# Patient Record
Sex: FEMALE
[De-identification: names, ages, dates, MRNs, and addresses within clinical notes are randomized per-mention and may not be internally consistent; named-entity substitution may affect disease eponyms.]

---

## 2022-10-19 ENCOUNTER — HOSPITAL ENCOUNTER (INPATIENT)
Dept: HOSPITAL 75 - ER | Age: 62
LOS: 3 days | Discharge: HOME | DRG: 872 | End: 2022-10-22
Attending: INTERNAL MEDICINE | Admitting: INTERNAL MEDICINE
Payer: MEDICAID

## 2022-10-19 VITALS — DIASTOLIC BLOOD PRESSURE: 62 MMHG | SYSTOLIC BLOOD PRESSURE: 102 MMHG

## 2022-10-19 VITALS — DIASTOLIC BLOOD PRESSURE: 65 MMHG | SYSTOLIC BLOOD PRESSURE: 106 MMHG

## 2022-10-19 VITALS — WEIGHT: 293 LBS | BODY MASS INDEX: 50.64 KG/M2 | HEIGHT: 63.78 IN

## 2022-10-19 DIAGNOSIS — Z91.199: ICD-10-CM

## 2022-10-19 DIAGNOSIS — E11.65: ICD-10-CM

## 2022-10-19 DIAGNOSIS — E87.8: ICD-10-CM

## 2022-10-19 DIAGNOSIS — I10: ICD-10-CM

## 2022-10-19 DIAGNOSIS — H35.30: ICD-10-CM

## 2022-10-19 DIAGNOSIS — E78.00: ICD-10-CM

## 2022-10-19 DIAGNOSIS — Z87.891: ICD-10-CM

## 2022-10-19 DIAGNOSIS — F41.9: ICD-10-CM

## 2022-10-19 DIAGNOSIS — G25.81: ICD-10-CM

## 2022-10-19 DIAGNOSIS — N39.0: ICD-10-CM

## 2022-10-19 DIAGNOSIS — E87.1: ICD-10-CM

## 2022-10-19 DIAGNOSIS — E83.42: ICD-10-CM

## 2022-10-19 DIAGNOSIS — E87.6: ICD-10-CM

## 2022-10-19 DIAGNOSIS — E11.40: ICD-10-CM

## 2022-10-19 DIAGNOSIS — N17.9: ICD-10-CM

## 2022-10-19 DIAGNOSIS — A41.51: Primary | ICD-10-CM

## 2022-10-19 DIAGNOSIS — Z20.822: ICD-10-CM

## 2022-10-19 DIAGNOSIS — R51.9: ICD-10-CM

## 2022-10-19 DIAGNOSIS — E86.0: ICD-10-CM

## 2022-10-19 DIAGNOSIS — F32.A: ICD-10-CM

## 2022-10-19 DIAGNOSIS — R11.0: ICD-10-CM

## 2022-10-19 LAB
ALBUMIN SERPL-MCNC: 3.8 GM/DL (ref 3.2–4.5)
ALP SERPL-CCNC: 72 U/L (ref 40–136)
ALT SERPL-CCNC: 34 U/L (ref 0–55)
AMYLASE SERPL-CCNC: 48 U/L (ref 25–125)
APTT BLD: 37 SEC (ref 24–35)
APTT PPP: YELLOW S
BACTERIA #/AREA URNS HPF: (no result) /HPF
BARBITURATES UR QL: NEGATIVE
BASOPHILS # BLD AUTO: 0 10^3/UL (ref 0–0.1)
BASOPHILS NFR BLD AUTO: 0 % (ref 0–10)
BENZODIAZ UR QL SCN: POSITIVE
BILIRUB SERPL-MCNC: 0.8 MG/DL (ref 0.1–1)
BILIRUB UR QL STRIP: NEGATIVE
BUN/CREAT SERPL: 12
CALCIUM SERPL-MCNC: 9.3 MG/DL (ref 8.5–10.1)
CHLORIDE SERPL-SCNC: 81 MMOL/L (ref 98–107)
CO2 SERPL-SCNC: 30 MMOL/L (ref 21–32)
COCAINE UR QL: NEGATIVE
CREAT SERPL-MCNC: 1.12 MG/DL (ref 0.6–1.3)
EOSINOPHIL # BLD AUTO: 0 10^3/UL (ref 0–0.3)
EOSINOPHIL NFR BLD AUTO: 0 % (ref 0–10)
ERYTHROCYTE [SEDIMENTATION RATE] IN BLOOD: 73 MM/HR (ref 0–30)
FIBRINOGEN PPP-MCNC: CLEAR MG/DL
GFR SERPLBLD BASED ON 1.73 SQ M-ARVRAT: 56 ML/MIN
GLUCOSE SERPL-MCNC: 292 MG/DL (ref 70–105)
GLUCOSE UR STRIP-MCNC: (no result) MG/DL
HCT VFR BLD CALC: 39 % (ref 35–52)
HGB BLD-MCNC: 13.9 G/DL (ref 11.5–16)
INR PPP: 1.1 (ref 0.8–1.4)
KETONES UR QL STRIP: NEGATIVE
LEUKOCYTE ESTERASE UR QL STRIP: (no result)
LIPASE SERPL-CCNC: 221 U/L (ref 8–78)
LYMPHOCYTES # BLD AUTO: 0.8 10^3/UL (ref 1–4)
LYMPHOCYTES NFR BLD AUTO: 6 % (ref 12–44)
MAGNESIUM SERPL-MCNC: 1.5 MG/DL (ref 1.6–2.4)
MANUAL DIFFERENTIAL PERFORMED BLD QL: YES
MCH RBC QN AUTO: 30 PG (ref 25–34)
MCHC RBC AUTO-ENTMCNC: 35 G/DL (ref 32–36)
MCV RBC AUTO: 85 FL (ref 80–99)
METHADONE UR QL SCN: NEGATIVE
MONOCYTES # BLD AUTO: 0.9 10^3/UL (ref 0–1)
MONOCYTES NFR BLD AUTO: 7 % (ref 0–12)
MONOCYTES NFR BLD: 5 %
NEUTROPHILS # BLD AUTO: 10.8 10^3/UL (ref 1.8–7.8)
NEUTROPHILS NFR BLD AUTO: 86 % (ref 42–75)
NEUTS BAND NFR BLD MANUAL: 85 %
NITRITE UR QL STRIP: NEGATIVE
OPIATES UR QL SCN: NEGATIVE
OXYCODONE UR QL: NEGATIVE
PH UR STRIP: 6 [PH] (ref 5–9)
PLATELET # BLD: 161 10^3/UL (ref 130–400)
PMV BLD AUTO: 10 FL (ref 9–12.2)
POTASSIUM SERPL-SCNC: 2.9 MMOL/L (ref 3.6–5)
PROPOXYPH UR QL: NEGATIVE
PROT SERPL-MCNC: 7.5 GM/DL (ref 6.4–8.2)
PROT UR QL STRIP: (no result)
PROTHROMBIN TIME: 15 SEC (ref 12.2–14.7)
RBC #/AREA URNS HPF: (no result) /HPF
RBC MORPH BLD: NORMAL
SODIUM SERPL-SCNC: 124 MMOL/L (ref 135–145)
SP GR UR STRIP: 1.02 (ref 1.02–1.02)
TRICYCLICS UR QL SCN: NEGATIVE
VARIANT LYMPHS NFR BLD MANUAL: 10 %
WBC # BLD AUTO: 12.5 10^3/UL (ref 4.3–11)
WBC #/AREA URNS HPF: (no result) /HPF

## 2022-10-19 PROCEDURE — 87636 SARSCOV2 & INF A&B AMP PRB: CPT

## 2022-10-19 PROCEDURE — 93041 RHYTHM ECG TRACING: CPT

## 2022-10-19 PROCEDURE — 83036 HEMOGLOBIN GLYCOSYLATED A1C: CPT

## 2022-10-19 PROCEDURE — 82010 KETONE BODYS QUAN: CPT

## 2022-10-19 PROCEDURE — 36415 COLL VENOUS BLD VENIPUNCTURE: CPT

## 2022-10-19 PROCEDURE — 87077 CULTURE AEROBIC IDENTIFY: CPT

## 2022-10-19 PROCEDURE — 93005 ELECTROCARDIOGRAM TRACING: CPT

## 2022-10-19 PROCEDURE — 71045 X-RAY EXAM CHEST 1 VIEW: CPT

## 2022-10-19 PROCEDURE — 86141 C-REACTIVE PROTEIN HS: CPT

## 2022-10-19 PROCEDURE — 80320 DRUG SCREEN QUANTALCOHOLS: CPT

## 2022-10-19 PROCEDURE — 87040 BLOOD CULTURE FOR BACTERIA: CPT

## 2022-10-19 PROCEDURE — 83605 ASSAY OF LACTIC ACID: CPT

## 2022-10-19 PROCEDURE — 94760 N-INVAS EAR/PLS OXIMETRY 1: CPT

## 2022-10-19 PROCEDURE — 80306 DRUG TEST PRSMV INSTRMNT: CPT

## 2022-10-19 PROCEDURE — 85025 COMPLETE CBC W/AUTO DIFF WBC: CPT

## 2022-10-19 PROCEDURE — 85730 THROMBOPLASTIN TIME PARTIAL: CPT

## 2022-10-19 PROCEDURE — 82150 ASSAY OF AMYLASE: CPT

## 2022-10-19 PROCEDURE — 87186 SC STD MICRODIL/AGAR DIL: CPT

## 2022-10-19 PROCEDURE — 84145 PROCALCITONIN (PCT): CPT

## 2022-10-19 PROCEDURE — 74177 CT ABD & PELVIS W/CONTRAST: CPT

## 2022-10-19 PROCEDURE — 81000 URINALYSIS NONAUTO W/SCOPE: CPT

## 2022-10-19 PROCEDURE — 85027 COMPLETE CBC AUTOMATED: CPT

## 2022-10-19 PROCEDURE — 83735 ASSAY OF MAGNESIUM: CPT

## 2022-10-19 PROCEDURE — 82947 ASSAY GLUCOSE BLOOD QUANT: CPT

## 2022-10-19 PROCEDURE — 85007 BL SMEAR W/DIFF WBC COUNT: CPT

## 2022-10-19 PROCEDURE — 85652 RBC SED RATE AUTOMATED: CPT

## 2022-10-19 PROCEDURE — 83690 ASSAY OF LIPASE: CPT

## 2022-10-19 PROCEDURE — 85610 PROTHROMBIN TIME: CPT

## 2022-10-19 PROCEDURE — 80053 COMPREHEN METABOLIC PANEL: CPT

## 2022-10-19 PROCEDURE — 87088 URINE BACTERIA CULTURE: CPT

## 2022-10-19 RX ADMIN — INSULIN ASPART SCH UNIT: 100 INJECTION, SOLUTION INTRAVENOUS; SUBCUTANEOUS at 21:37

## 2022-10-19 RX ADMIN — SENNOSIDES SCH MG: 8.6 TABLET, FILM COATED ORAL at 21:00

## 2022-10-19 RX ADMIN — DOCUSATE SODIUM SCH MG: 100 CAPSULE ORAL at 21:00

## 2022-10-19 RX ADMIN — SODIUM CHLORIDE AND POTASSIUM CHLORIDE SCH MLS/HR: 9; 1.49 INJECTION, SOLUTION INTRAVENOUS at 22:06

## 2022-10-19 NOTE — DIAGNOSTIC IMAGING REPORT
PATIENT HISTORY: Fever. 



TECHNIQUE: Single frontal view of the chest.



COMPARISON: None.



FINDINGS:



The lung volumes are normal. No focal consolidation is seen. No

large pleural effusion or pneumothorax is seen. The

cardiomediastinal silhouette is normal in size and contour. No

acute osseous abnormality is seen. 



IMPRESSION:



No acute pulmonary abnormality seen.



Dictated by: 



  Dictated on workstation # [id number]

## 2022-10-19 NOTE — DIAGNOSTIC IMAGING REPORT
EXAMINATION: CT abdomen and pelvis with intravenous contrast.



TECHNIQUE: Multiple contiguous axial images were obtained through

the abdomen and pelvis after the uneventful administration of

intravenous contrast. All CT scans use one or more of the

following dose optimizing techniques: automated exposure control,

MA and/or KvP adjustment based on patient size and exam type or

iterative reconstruction. 



HISTORY: Abdominal pain.



COMPARISON: None available.



FINDINGS: Limited views of the lower thorax are unremarkable.



The liver is normal without focal lesion. There is no biliary

ductal dilation. Gallbladder is normal. Pancreas is normal.

Spleen is normal. Adrenal glands are normal.



The kidneys are normal. There is no hydronephrosis. Urinary

bladder is normal.



Bowel is normal in caliber without obstruction or inflammation.

No free fluid or air. No abdominal or pelvic lymphadenopathy.

Aorta is normal in caliber without aneurysm.



There is no suspicious osseous lesion.



IMPRESSION: No acute abnormality in the abdomen or pelvis.



Dictated by: 



  Dictated on workstation # [id number]

## 2022-10-19 NOTE — ED GENERAL
General


Chief Complaint:  Glucose Problems


Stated Complaint:  HIGH BLOOD SUGAR,DIABETIC,FATIGUE


Nursing Triage Note:  


ARRIVED VIA AMB TO TRIAGE WITH COMPLAINTS OF HIGH BLOOD SUGAR.  PT RECENTLY TOOK




HERSELF OFF ALL HER DIABETIC MEDS BUT STARTED THEM AGAIN YESTERDAY.  PT IS ALSO 


BEING TREATED FOR A UTI.


Source of Information:  Patient





History of Present Illness


Date Seen by Provider:  Oct 19, 2022


Time Seen by Provider:  18:12


Initial Comments


PT ARRIVES VIA POV FROM HOME


C/O ELEVATED BLOOD SUGAR


STATES HIGHEST HAS BEEN 464, HAS BEEN 'S ALL DAY TODAY


PT SELF DC'D ALL OF HER MEDICATIONS A FEW MONTHS AGO


STARTED CHECKING HER BLOOD SUGAR ON SUNDAY


RESTARTED HER MEDICATIONS YESTERDAY





C/O BEING TIRED


C/O NAUSEA, AND VOMITED X 1 TODAY AT [facility]


NO DIARRHEA, HAD NORMAL BM TODAY


FEVER 100-101 SINCE SUNDAY. TOOK MOTRIN X 1 AT NOON TODAY. 


+ GENERALIZED ABDOMINAL PAIN 


+ UTI SYMPTOMS--PAIN, URGENCY, FREQUENCY, SMALL AMOUNTS, INCONTINENCE. 





NO URI SYMPTOMS


NO COUGH OR SHORTNESS OF BREATH


NO CHEST PAIN 





WENT TO [facility] WALK IN CLINIC LAST NIGHT AND HAD UA DONE AND GIVEN RX FOR 

MACROBID. NO OTHER TESTS WERE DONE





PT HAS NOT HAD COVID VACCINE





NO PRIOR VISITS HERE





PCP: [facility]. DR. CHRISTINE VAUGHAN, HAS NOT BEEN THERE IN A FEW MONTHS.





Allergies and Home Medications


Allergies


Coded Allergies:  


     penicillin V (Verified  Allergy, Severe, HIVES, 10/19/22)


     pregabalin (Verified  Allergy, Severe, EFFECTS EYE SIGHT, 10/19/22)


     codeine (Verified  Adverse Reaction, Unknown, VOMITING, 10/19/22)





Review of Systems


Review of Systems


Constitutional:  see HPI, fever, malaise, weakness


EENTM:  no symptoms reported


Respiratory:  no symptoms reported


Cardiovascular:  no symptoms reported


Gastrointestinal:  see HPI, abdominal pain; No constipation, No diarrhea; 

nausea, vomiting


Genitourinary:  see HPI, dysuria, frequency, incontinence, pain


Musculoskeletal:  no symptoms reported


Skin:  no symptoms reported


Psychiatric/Neurological:  No Symptoms Reported


Hematologic/Lymphatic:  No Symptoms Reported


Immunological/Allergic:  no symptoms reported





Past Medical-Social-Family Hx


Patient Social History


Tobacco Use?:  Yes


Tobacco type used:  Cigarettes


Smoking Status:  Former Smoker


Substance use?:  No


Alcohol Use?:  Yes


Alcohol Frequency:  Couple times a week





Past Medical History


Surgeries:  Yes


Appendectomy, Hysterectomy, Oophorectomy, Orthopedic


Respiratory:  No


Cardiac:  Yes


High Cholesterol, Hypertension


Neurological:  Yes


Neuropathy


Reproductive Disorders:  Yes


Female Reproductive Disorders:  Menstrual Problems


GYN History:  Hysterectomy, Menopausal


Genitourinary:  No


Gastrointestinal:  Yes (S/P APPY)


Musculoskeletal:  Yes (BILATERAL HIP SCOPES; LEFT ACHILLES TENDON REPAIR)


Endocrine:  Yes (OBESITY)


Diabetes, Non-Insulin dep


HEENT:  Yes ("LEGALLY BLIND" )


Macular Degeneration


Cancer:  No


Psychosocial:  Yes


Anxiety, Depression


Integumentary:  No


Blood Disorders:  No





Family Medical History








SOCIAL HISTORY:


-SMOKED 1 PPD, QUIT 2002


-ETOH--DRINKS 2-3 TIMES A WEEK


-DRUGS--DENIES USE





PAST SURGICAL HISTORY:


-APPENDECTOMY


-HYSTERECTOMY/BILATERAL SALPINGO-OOPHORECTOMY


-BILATERAL HIP ARTHROSCOPIES


-LEFT ACHILLES TENDON REPAIR





Physical Exam


Vital Signs





Vital Signs - First Documented








 10/19/22





 17:25


 


Temp 37.8


 


Pulse 99


 


Resp 16


 


B/P (MAP) 166/92 (116)


 


Pulse Ox 94


 


O2 Delivery Room Air





Capillary Refill : Less Than 3 Seconds


Height, Weight, BMI


Height: '"


Weight: lbs. oz. kg; 49.00 BMI


Method:


General Appearance:  No Apparent Distress, WD/WN, Obese, Other (KEEPS EYES 

CLOSED AT ALL TIMES)


Neck:  Normal Inspection


Respiratory:  Normal Breath Sounds, No Accessory Muscle Use, No Respiratory 

Distress


Cardiovascular:  Regular Rate, Rhythm, No Edema, No JVD, No Murmur


Gastrointestinal:  Soft, Tenderness (DIFFUSE), Other (UNABLE TO DETERMINE OF 

ORGANOMEGALY OR MASSES PRESENT DUE TO BODY HABITUS)


Back:  No CVA Tenderness


Extremity:  Normal Capillary Refill, Normal Inspection, Normal Range of Motion, 

Non Tender, No Calf Tenderness, No Pedal Edema


Neurologic/Psychiatric:  Alert, Oriented x3, No Motor/Sensory Deficits, CNs II-

XII Norm as Tested


Skin:  Normal Color, Warm/Dry, Tattoos/Piercings





Focused Exam


Sepsis Stage:  Sepsis


Possible Source:  Genitouriary


Lactate Level


10/19/22 18:39: Lactic Acid Level 1.81





Time of Focused Exam:  19:30


Respiratory:  Normal Breath Sounds, No Accessory Muscle Use, No Respiratory 

Distress


Cardiovascular:  Regular Rate, Rhythm, No Edema, No Murmur


Capillary Refill:  Less Than 3 Seconds


Skin:  normal color, warm/dry


Lactic Acid Level





Laboratory Tests








Test


 10/19/22


18:39


 


Lactic Acid Level


 1.81 MMOL/L


(0.50-2.00)








Within 3hrs of presentation:  Admin fluids, Admin ABX, Blood cultures prior to 

ABX's, Focus exam, Lactate level





Progress/Results/Core Measures


Suspected Sepsis


SIRS


Temperature: 


Pulse: 99 


Respiratory Rate: 16


 


Laboratory Tests


10/19/22 18:39: White Blood Count 12.5H


Blood Pressure 166 /92 


Mean: 116


 


10/19/22 18:39: Lactic Acid Level 1.81


Laboratory Tests


10/19/22 18:39: 


Creatinine 1.12, INR Comment 1.1, Platelet Count 161, Total Bilirubin 0.8








Results/Orders


Lab Results





Laboratory Tests








Test


 10/19/22


17:36 10/19/22


17:57 10/19/22


18:27 10/19/22


18:39 Range/Units


 


 


Glucometer 302 H      MG/DL


 


Urine Color  YELLOW     


 


Urine Clarity  CLEAR     


 


Urine pH  6.0    5-9  


 


Urine Specific Gravity  1.025 H   1.016-1.022  


 


Urine Protein  2+ H   NEGATIVE  


 


Urine Glucose (UA)  1+ H   NEGATIVE  


 


Urine Ketones  NEGATIVE    NEGATIVE  


 


Urine Nitrite  NEGATIVE    NEGATIVE  


 


Urine Bilirubin  NEGATIVE    NEGATIVE  


 


Urine Urobilinogen  0.2    < = 1.0  MG/DL


 


Urine Leukocyte Esterase  1+ H   NEGATIVE  


 


Urine RBC (Auto)  2+ H   NEGATIVE  


 


Urine RBC  25-50 H    /HPF


 


Urine WBC   H    /HPF


 


Urine Squamous Epithelial


Cells 


 10-25 H


 


 


  /HPF





 


Urine Crystals  NONE     /LPF


 


Urine Bacteria  MODERATE H    /HPF


 


Urine Casts  NONE     /LPF


 


Urine Mucus  NEGATIVE     /LPF


 


Urine Culture Indicated  YES     


 


Influenza Type A (RT-PCR)   Not Detected   Not Detecte  


 


Influenza Type B (RT-PCR)   Not Detected   Not Detecte  


 


SARS-CoV-2 RNA (RT-PCR)   Not Detected   Not Detecte  


 


White Blood Count


 


 


 


 12.5 H


 4.3-11.0


10^3/uL


 


Red Blood Count


 


 


 


 4.64 


 3.80-5.11


10^6/uL


 


Hemoglobin    13.9  11.5-16.0  g/dL


 


Hematocrit    39  35-52  %


 


Mean Corpuscular Volume    85  80-99  fL


 


Mean Corpuscular Hemoglobin    30  25-34  pg


 


Mean Corpuscular Hemoglobin


Concent 


 


 


 35 


 32-36  g/dL





 


Red Cell Distribution Width    11.7  10.0-14.5  %


 


Platelet Count


 


 


 


 161 


 130-400


10^3/uL


 


Mean Platelet Volume    10.0  9.0-12.2  fL


 


Immature Granulocyte % (Auto)    1   %


 


Neutrophils (%) (Auto)    86 H 42-75  %


 


Lymphocytes (%) (Auto)    6 L 12-44  %


 


Monocytes (%) (Auto)    7  0-12  %


 


Eosinophils (%) (Auto)    0  0-10  %


 


Basophils (%) (Auto)    0  0-10  %


 


Neutrophils # (Auto)


 


 


 


 10.8 H


 1.8-7.8


10^3/uL


 


Lymphocytes # (Auto)


 


 


 


 0.8 L


 1.0-4.0


10^3/uL


 


Monocytes # (Auto)


 


 


 


 0.9 


 0.0-1.0


10^3/uL


 


Eosinophils # (Auto)


 


 


 


 0.0 


 0.0-0.3


10^3/uL


 


Basophils # (Auto)


 


 


 


 0.0 


 0.0-0.1


10^3/uL


 


Immature Granulocyte # (Auto)


 


 


 


 0.1 


 0.0-0.1


10^3/uL


 


Neutrophils % (Manual)    85   %


 


Lymphocytes % (Manual)    10   %


 


Monocytes % (Manual)    5   %


 


Blood Morphology Comment    NORMAL   


 


Erythrocyte Sedimentation Rate    73 H 0-30  MM/HR


 


Prothrombin Time    15.0 H 12.2-14.7  SEC


 


INR Comment    1.1  0.8-1.4  


 


Activated Partial


Thromboplast Time 


 


 


 37 H


 24-35  SEC





 


Sodium Level    124 *L 135-145  MMOL/L


 


Potassium Level    2.9 L 3.6-5.0  MMOL/L


 


Chloride Level    81 L   MMOL/L


 


Carbon Dioxide Level    30  21-32  MMOL/L


 


Anion Gap    13  5-14  MMOL/L


 


Blood Urea Nitrogen    13  7-18  MG/DL


 


Creatinine


 


 


 


 1.12 


 0.60-1.30


MG/DL


 


Estimat Glomerular Filtration


Rate 


 


 


 56 


  





 


BUN/Creatinine Ratio    12   


 


Glucose Level    292 H   MG/DL


 


Lactic Acid Level


 


 


 


 1.81 


 0.50-2.00


MMOL/L


 


Calcium Level    9.3  8.5-10.1  MG/DL


 


Corrected Calcium    9.5  8.5-10.1  MG/DL


 


Magnesium Level    1.5 L 1.6-2.4  MG/DL


 


Total Bilirubin    0.8  0.1-1.0  MG/DL


 


Aspartate Amino Transf


(AST/SGOT) 


 


 


 25 


 5-34  U/L





 


Alanine Aminotransferase


(ALT/SGPT) 


 


 


 34 


 0-55  U/L





 


Alkaline Phosphatase    72    U/L


 


C-Reactive Protein High


Sensitivity 


 


 


 41.38 H


 0.00-0.50


MG/DL


 


Total Protein    7.5  6.4-8.2  GM/DL


 


Albumin    3.8  3.2-4.5  GM/DL


 


Amylase Level    48    U/L


 


Lipase    221 H 8-78  U/L


 


Beta-Hydroxybutyrate (Chem


panel) 


 


 


 0.40 H


 0.00-0.27


MMOL/L


 


Procalcitonin    2.79 H <0.10  NG/ML


 


Urine Opiates Screen    NEGATIVE  NEGATIVE  


 


Urine Oxycodone Screen    NEGATIVE  NEGATIVE  


 


Urine Methadone Screen    NEGATIVE  NEGATIVE  


 


Urine Propoxyphene Screen    NEGATIVE  NEGATIVE  


 


Urine Barbiturates Screen    NEGATIVE  NEGATIVE  


 


Ur Tricyclic Antidepressants


Screen 


 


 


 NEGATIVE 


 NEGATIVE  





 


Urine Phencyclidine Screen    NEGATIVE  NEGATIVE  


 


Urine Amphetamines Screen    NEGATIVE  NEGATIVE  


 


Urine Methamphetamines Screen    NEGATIVE  NEGATIVE  


 


Urine Benzodiazepines Screen    POSITIVE H NEGATIVE  


 


Urine Cocaine Screen    NEGATIVE  NEGATIVE  


 


Urine Cannabinoids Screen    NEGATIVE  NEGATIVE  


 


Serum Alcohol    < 10  <10  MG/DL








My Orders





Orders - HAZEL HARRINGTON DO


Ed Iv/Invasive Line Start (10/19/22 18:17)


Ekg Tracing (10/19/22 18:17)


O2 (10/19/22 18:17)


Monitor-Rhythm Ecg Trace Only (10/19/22 18:17)


Chest 1 View, Ap/Pa Only (10/19/22 18:17)


Amylase (10/19/22 18:17)


Cbc With Automated Diff (10/19/22 18:17)


Comprehensive Metabolic Panel (10/19/22 18:17)


Hs C Reactive Protein (10/19/22 18:17)


Lactic Acid Analyzer (10/19/22 18:17)


Lipase (10/19/22 18:17)


Magnesium (10/19/22 18:17)


Procalcitonin (Pct) (10/19/22 18:17)


Blood Culture (10/19/22 18:17)


Erythrocyte Sedimentation Rate (10/19/22 18:17)


Covid 19 Inhouse Test (10/19/22 18:17)


Sputum Culture (10/19/22 18:17)


Protime With Inr (10/19/22 18:17)


Partial Thromboplastin Time (10/19/22 18:17)


Ed Iv/Invasive Line Start (10/19/22 18:17)


Ed Iv/Invasive Line Start (10/19/22 18:17)


Vital Signs Adult Sepsis Patie Q15M (10/19/22 18:17)


O2 (10/19/22 18:17)


Remove Rings In Anticipation O (10/19/22 18:17)


Cefepime Injection (Maxipime Injection) (10/19/22 18:30)


Influenza A And B By Pcr (10/19/22 18:17)


Isolation Central Supply Req (10/19/22 18:17)


Ed Iv/Invasive Line Start (10/19/22 18:17)


Ns Iv 1000 Ml (Sodium Chloride 0.9%) (10/19/22 18:30)


Accucheck Stat ONCE (10/19/22 18:17)


Alcohol (10/19/22 18:55)


Drug Screen Stat (Urine) (10/19/22 18:55)


Manual Differential (10/19/22 18:39)


Beta Hydroxybutyrate (10/19/22 18:59)


Hemoglobin A1c (10/19/22 18:59)


Ondansetron Injection (Zofran Injectio (10/19/22 19:30)


Magnesium 1 Gm/100 Ml Ivpb (Magnesium Parks (10/19/22 19:30)


Ct Abdomen/Pelvis W (10/19/22 19:21)


Iohexol Injection (Omnipaque 350 Mg/Ml 1 (10/19/22 19:45)


Di Iv Start (Assessment) .IV start (10/19/22 19:39)


Received Contrast (Hold Metformin- Contr (10/19/22 19:45)


Ns (Ivpb) (Sodium Chloride 0.9% Ivpb Bag (10/19/22 19:45)


Ed Iv/Invasive Line Start (10/19/22 19:41)


Ns Iv 1000 Ml (Sodium Chloride 0.9%) (10/19/22 19:45)


Acetaminophen  Tablet (Tylenol  Tablet) (10/19/22 20:00)


Ketorolac Injection (Toradol Injection) (10/19/22 20:00)


Ed Admission (Communication) (10/19/22 20:27)





Medications Given in ED





Current Medications








 Medications  Dose


 Ordered  Sig/Douglas


 Route  Start Time


 Stop Time Status Last Admin


Dose Admin


 


 Acetaminophen  1,000 mg  ONCE  ONCE


 PO  10/19/22 20:00


 10/19/22 20:01 DC 10/19/22 20:21


1,000 MG


 


 Cefepime HCl 1000


 mg/Sodium Chloride  50 ml @ 


 100 mls/hr  ONCE  ONCE


 IV  10/19/22 18:30


 10/19/22 18:59 DC 10/19/22 20:21


100 MLS/HR


 


 Iohexol  100 ml  ONCE  ONCE


 IV  10/19/22 19:45


 10/19/22 19:46 DC 10/19/22 19:50


80 ML


 


 Ketorolac


 Tromethamine  30 mg  ONCE  ONCE


 IVP  10/19/22 20:00


 10/19/22 20:01 DC 10/19/22 20:21


30 MG


 


 Magnesium Sulfate/


 Dextrose  100 ml @ 


 100 mls/hr  ONCE  ONCE


 IV  10/19/22 19:30


 10/19/22 20:29 DC 10/19/22 20:34


100 MLS/HR


 


 Ondansetron HCl  4 mg  ONCE  ONCE


 IVP  10/19/22 19:30


 10/19/22 19:31 DC 10/19/22 20:20


4 MG


 


 Sodium Chloride  100 ml  ONCE  ONCE


 IV  10/19/22 19:45


 10/19/22 19:46 DC 10/19/22 19:50


80 ML








Vital Signs/I&O











 10/19/22





 17:25


 


Temp 37.8


 


Pulse 99


 


Resp 16


 


B/P (MAP) 166/92 (116)


 


Pulse Ox 94


 


O2 Delivery Room Air





Capillary Refill : Less Than 3 Seconds








Blood Pressure Mean:                    116











Point of Care Testing


Finger Stick Blood Glucose:  302


Progress Note :  


Progress Note


SEPSIS PROTOCOL INITIATED





NO DETERIORATION IN PT'S CONDITION DURING ER STAY





GIVEN:


-IV FLUIDS


-ANTIBIOTICS


-MAGNESIUM


-PAIN MEDICATION


-NAUSEA MEDICATION


-TYLENOL FOR FEVER





ECG


Initial ECG Impression Date:  Oct 19, 2022


Initial ECG Impression Time:  18:27


Initial ECG Rate:  97


Initial ECG Rhythm:  Normal Sinus


Initial ECG Impression:  Nonspecific Changes





Diagnostic Imaging





Comments


CXR--PER RADIOLOGIST REPORT AT 1857





FINDINGS:





The lung volumes are normal. No focal consolidation is seen. No


large pleural effusion or pneumothorax is seen. The


cardiomediastinal silhouette is normal in size and contour. No


acute osseous abnormality is seen. 





IMPRESSION:





No acute pulmonary abnormality seen.








CT ABDOMEN/PELVIS--PER RADIOLOGIST REPORT AT 2015


FINDINGS: Limited views of the lower thorax are unremarkable.





The liver is normal without focal lesion. There is no biliary


ductal dilation. Gallbladder is normal. Pancreas is normal.


Spleen is normal. Adrenal glands are normal.





The kidneys are normal. There is no hydronephrosis. Urinary


bladder is normal.





Bowel is normal in caliber without obstruction or inflammation.


No free fluid or air. No abdominal or pelvic lymphadenopathy.


Aorta is normal in caliber without aneurysm.





There is no suspicious osseous lesion.





IMPRESSION: No acute abnormality in the abdomen or pelvis.


   Reviewed:  Reviewed by Me





Departure


Communication (Admissions)


2025--SPOKE WITH DR. DIEGO, HOSPITALIST FOR [facility]. ACCEPTS PT FOR ADMIT. SHE 

WILL PUT IN ADMIT ORDERS.





Impression





   Primary Impression:  


   UTI (urinary tract infection)


   Additional Impressions:  


   Uncontrolled diabetes mellitus


   Electrolyte imbalance


   Hypomagnesemia


   Hyponatremia


   Non-compliance


   Sepsis


   Hypokalemia


Disposition:  09 ADMITTED AS INPATIENT


Condition:  Stable





Admissions


Decision to Admit Reason:  Admit from ER (General)


Decision to Admit/Date:  Oct 19, 2022


Time/Decision to Admit Time:  20:25





Departure-Patient Inst.


Referrals:  


SEBASTIAN VAUGHAN DO (PCP/Family)


Primary Care Physician











HAZEL HARRINGTON DO                 Oct 19, 2022 18:54

## 2022-10-20 VITALS — DIASTOLIC BLOOD PRESSURE: 92 MMHG | SYSTOLIC BLOOD PRESSURE: 166 MMHG

## 2022-10-20 VITALS — DIASTOLIC BLOOD PRESSURE: 73 MMHG | SYSTOLIC BLOOD PRESSURE: 119 MMHG

## 2022-10-20 VITALS — SYSTOLIC BLOOD PRESSURE: 156 MMHG | DIASTOLIC BLOOD PRESSURE: 77 MMHG

## 2022-10-20 VITALS — SYSTOLIC BLOOD PRESSURE: 118 MMHG | DIASTOLIC BLOOD PRESSURE: 65 MMHG

## 2022-10-20 VITALS — SYSTOLIC BLOOD PRESSURE: 108 MMHG | DIASTOLIC BLOOD PRESSURE: 67 MMHG

## 2022-10-20 VITALS — DIASTOLIC BLOOD PRESSURE: 75 MMHG | SYSTOLIC BLOOD PRESSURE: 121 MMHG

## 2022-10-20 LAB
ALBUMIN SERPL-MCNC: 3 GM/DL (ref 3.2–4.5)
ALP SERPL-CCNC: 68 U/L (ref 40–136)
ALT SERPL-CCNC: 28 U/L (ref 0–55)
BASOPHILS # BLD AUTO: 0 10^3/UL (ref 0–0.1)
BASOPHILS NFR BLD AUTO: 0 % (ref 0–10)
BILIRUB SERPL-MCNC: 0.6 MG/DL (ref 0.1–1)
BLD SMEAR INTERP: YES
BUN/CREAT SERPL: 14
CALCIUM SERPL-MCNC: 8.2 MG/DL (ref 8.5–10.1)
CHLORIDE SERPL-SCNC: 93 MMOL/L (ref 98–107)
CO2 SERPL-SCNC: 26 MMOL/L (ref 21–32)
CREAT SERPL-MCNC: 1.1 MG/DL (ref 0.6–1.3)
EOSINOPHIL # BLD AUTO: 0 10^3/UL (ref 0–0.3)
EOSINOPHIL NFR BLD AUTO: 0 % (ref 0–10)
GFR SERPLBLD BASED ON 1.73 SQ M-ARVRAT: 58 ML/MIN
GLUCOSE SERPL-MCNC: 247 MG/DL (ref 70–105)
HCT VFR BLD CALC: 34 % (ref 35–52)
HGB BLD-MCNC: 11.9 G/DL (ref 11.5–16)
LYMPHOCYTES # BLD AUTO: 0.4 10^3/UL (ref 1–4)
LYMPHOCYTES NFR BLD AUTO: 4 % (ref 12–44)
MAGNESIUM SERPL-MCNC: 1.7 MG/DL (ref 1.6–2.4)
MANUAL DIFFERENTIAL PERFORMED BLD QL: NO
MCH RBC QN AUTO: 30 PG (ref 25–34)
MCHC RBC AUTO-ENTMCNC: 35 G/DL (ref 32–36)
MCV RBC AUTO: 85 FL (ref 80–99)
MONOCYTES # BLD AUTO: 0.7 10^3/UL (ref 0–1)
MONOCYTES NFR BLD AUTO: 8 % (ref 0–12)
NEUTROPHILS # BLD AUTO: 7.4 10^3/UL (ref 1.8–7.8)
NEUTROPHILS NFR BLD AUTO: 86 % (ref 42–75)
PLATELET # BLD: 144 10^3/UL (ref 130–400)
PMV BLD AUTO: 9.9 FL (ref 9–12.2)
POTASSIUM SERPL-SCNC: 2.9 MMOL/L (ref 3.6–5)
PROT SERPL-MCNC: 6.4 GM/DL (ref 6.4–8.2)
SODIUM SERPL-SCNC: 130 MMOL/L (ref 135–145)
WBC # BLD AUTO: 8.5 10^3/UL (ref 4.3–11)

## 2022-10-20 RX ADMIN — KETOROLAC TROMETHAMINE PRN MG: 15 INJECTION, SOLUTION INTRAMUSCULAR; INTRAVENOUS at 21:51

## 2022-10-20 RX ADMIN — ENOXAPARIN SODIUM SCH MG: 100 INJECTION SUBCUTANEOUS at 09:44

## 2022-10-20 RX ADMIN — MAGNESIUM SULFATE IN DEXTROSE SCH MLS/HR: 10 INJECTION, SOLUTION INTRAVENOUS at 08:37

## 2022-10-20 RX ADMIN — SODIUM CHLORIDE SCH MLS/HR: 900 INJECTION INTRAVENOUS at 10:33

## 2022-10-20 RX ADMIN — SODIUM CHLORIDE AND POTASSIUM CHLORIDE SCH MLS/HR: 9; 1.49 INJECTION, SOLUTION INTRAVENOUS at 20:14

## 2022-10-20 RX ADMIN — POTASSIUM CHLORIDE SCH MLS/HR: 200 INJECTION, SOLUTION INTRAVENOUS at 06:58

## 2022-10-20 RX ADMIN — DOCUSATE SODIUM SCH MG: 100 CAPSULE ORAL at 09:43

## 2022-10-20 RX ADMIN — POTASSIUM CHLORIDE SCH MLS/HR: 200 INJECTION, SOLUTION INTRAVENOUS at 10:33

## 2022-10-20 RX ADMIN — INSULIN ASPART SCH UNIT: 100 INJECTION, SOLUTION INTRAVENOUS; SUBCUTANEOUS at 12:43

## 2022-10-20 RX ADMIN — MAGNESIUM SULFATE IN DEXTROSE SCH MLS/HR: 10 INJECTION, SOLUTION INTRAVENOUS at 09:43

## 2022-10-20 RX ADMIN — POTASSIUM CHLORIDE SCH MLS/HR: 200 INJECTION, SOLUTION INTRAVENOUS at 14:56

## 2022-10-20 RX ADMIN — ONDANSETRON PRN MG: 2 INJECTION, SOLUTION INTRAMUSCULAR; INTRAVENOUS at 10:34

## 2022-10-20 RX ADMIN — INSULIN ASPART SCH UNIT: 100 INJECTION, SOLUTION INTRAVENOUS; SUBCUTANEOUS at 06:45

## 2022-10-20 RX ADMIN — POTASSIUM CHLORIDE SCH MLS/HR: 200 INJECTION, SOLUTION INTRAVENOUS at 12:42

## 2022-10-20 RX ADMIN — SODIUM CHLORIDE SCH MLS/HR: 900 INJECTION INTRAVENOUS at 18:47

## 2022-10-20 RX ADMIN — POTASSIUM CHLORIDE SCH MLS/HR: 200 INJECTION, SOLUTION INTRAVENOUS at 08:37

## 2022-10-20 RX ADMIN — SODIUM CHLORIDE AND POTASSIUM CHLORIDE SCH MLS/HR: 9; 1.49 INJECTION, SOLUTION INTRAVENOUS at 09:44

## 2022-10-20 RX ADMIN — SENNOSIDES SCH MG: 8.6 TABLET, FILM COATED ORAL at 09:43

## 2022-10-20 RX ADMIN — ENOXAPARIN SODIUM SCH MG: 100 INJECTION SUBCUTANEOUS at 20:26

## 2022-10-20 RX ADMIN — INSULIN ASPART SCH UNIT: 100 INJECTION, SOLUTION INTRAVENOUS; SUBCUTANEOUS at 18:29

## 2022-10-20 RX ADMIN — SENNOSIDES SCH MG: 8.6 TABLET, FILM COATED ORAL at 20:32

## 2022-10-20 RX ADMIN — DOCUSATE SODIUM SCH MG: 100 CAPSULE ORAL at 20:32

## 2022-10-20 RX ADMIN — SODIUM CHLORIDE SCH MLS/HR: 900 INJECTION INTRAVENOUS at 21:50

## 2022-10-20 RX ADMIN — POTASSIUM CHLORIDE SCH MLS/HR: 200 INJECTION, SOLUTION INTRAVENOUS at 09:41

## 2022-10-20 RX ADMIN — INSULIN ASPART SCH UNIT: 100 INJECTION, SOLUTION INTRAVENOUS; SUBCUTANEOUS at 11:35

## 2022-10-20 RX ADMIN — POTASSIUM CHLORIDE SCH MLS/HR: 200 INJECTION, SOLUTION INTRAVENOUS at 11:44

## 2022-10-20 RX ADMIN — KETOROLAC TROMETHAMINE PRN MG: 15 INJECTION, SOLUTION INTRAMUSCULAR; INTRAVENOUS at 04:48

## 2022-10-20 RX ADMIN — POTASSIUM CHLORIDE SCH MLS/HR: 200 INJECTION, SOLUTION INTRAVENOUS at 09:45

## 2022-10-20 RX ADMIN — INSULIN ASPART SCH UNIT: 100 INJECTION, SOLUTION INTRAVENOUS; SUBCUTANEOUS at 20:36

## 2022-10-20 NOTE — HISTORY & PHYSICAL-HOSPITALIST
DIANA MADRID 10/20/22 1159:


History of Present Illness


HPI/Chief Complaint


CC: Increased blood sugars at home, nausea, vomiting, abdominal pain, and urina

ry tract infection





HPI: Patient states 10/8/22 she went to the urgent care for lower urinary tract 

symptoms, with increasing urgency and frequency, and decreased voiding amount, 

and that the urgent care said she should not be on both metformin and her 

victoza due to the increasing likelihood of urinary tract infections. Thus the 

patient started to self-wean herself off of the medications trying one at a 

time. She decreased her metformin from 4x daily down to 2x daily and had 

discontinued her victoza. She noticed her blood sugar values increasing during 

this period, with the highest getting to 464. She continued her Metformin at 

this point and noticed her sugars getting into the 300s. She presented to the ED

due to fatigue, abdominal pain, nausea, vomiting, and continuation of her lower 

urinary tract symptoms.


Source:  patient


Exam Limitations:  no limitations


Date Seen


10/20/22


Time Seen by a Provider:  11:30


Attending Physician


Marcos Aguilar DO


PCP


Admitting Physician:


Sherley Diego DO 








Attending Physician:


Sherley Diego DO


Referring Physician





Date of Admission


Oct 19, 2022 at 20:27





Home Medications & Allergies


Home Medications


Reviewed patient Home Medication Reconciliation performed by pharmacy medication

reconciliations technician and/or nursing.


Patients Allergies have been reviewed.





Allergies





Allergies


Coded Allergies


  penicillin V (Verified Allergy, Severe, HIVES, 10/19/22)


  pregabalin (Verified Allergy, Severe, EFFECTS EYE SIGHT, 10/19/22)


  codeine (Verified Adverse Reaction, Unknown, VOMITING, 10/19/22)








Past Medical-Social-Family Hx


Patient Social History


Living Status:  lives with her sister and brother-in-law


Tobacco Use?:  No


Tobacco type used:  Cigarettes


Smoking Status:  Former Smoker


Use of E-Cig and/or Vaping dev:  No


Substance use?:  No


Alcohol Use?:  No


Alcohol type:  Hard Liquor


Additional alcohol type:  whiskey and diet coke


Alcohol Frequency:  Couple times a week


Pt feels they are or have been:  No





Current Status


Pregnancy status:  No


Advance Directives:  Yes


Advance Directive Location:  Home


Communicates:  Verbally


Primary Language:  English


Preferred Spoken Language:  English


Is interpretation needed?:  No


Sensory deficits:  Vision impairment





Past Medical History


Surgeries:  Appendectomy, Hysterectomy, Oophorectomy, Orthopedic


High Cholesterol, Hypertension


Neuropathy


GYN History:  Hysterectomy, Menopausal


Diabetes, Non-Insulin dep


Macular Degeneration


Anxiety, Depression


Blood Disorders:  No





Family Medical History


Diabetes, Hypertension








SOCIAL HISTORY:


-SMOKED 1 PPD, QUIT 2002


-ETOH--DRINKS 2-3 TIMES A WEEK


-DRUGS--DENIES USE





PAST SURGICAL HISTORY:


-APPENDECTOMY


-HYSTERECTOMY/BILATERAL SALPINGO-OOPHORECTOMY


-BILATERAL HIP ARTHROSCOPIES


-LEFT ACHILLES TENDON REPAIR





Review of Systems


Constitutional:  other (lightheadedness)


EENTM:  no symptoms reported


Respiratory:  no symptoms reported


Cardiovascular:  no symptoms reported


Gastrointestinal:  abdominal pain (initially on presentation but has resolved)


Genitourinary:  dysuria, frequency


Musculoskeletal:  neck pain


Skin:  no symptoms reported


Psychiatric/Neurological:  No Symptoms Reported, Headache


All Other Systems Reviewed


Negative Unless Noted:  Yes





Physical Exam


Physical Exam


Vital Signs





Vital Signs - First Documented








 10/19/22 10/20/22 10/20/22





 17:25 00:15 08:56


 


Temp 37.8  


 


Pulse 99  


 


Resp 16  


 


B/P (MAP) 166/92 (116)  


 


Pulse Ox 94  


 


O2 Delivery Room Air  


 


O2 Flow Rate   0.00


 


FiO2  21 





Capillary Refill : Less Than 3 Seconds


Height, Weight, BMI


Height: '"


Weight: lbs. oz. kg; 50.71 BMI


Method:


General Appearance:  No Apparent Distress, WD/WN, Obese


HEENT:  PERRL/EOMI, Normal ENT Inspection


Neck:  Normal Inspection, Limited Range of Motion (due to pain)


Respiratory:  Chest Non Tender, Lungs Clear, Normal Breath Sounds, No Accessory 

Muscle Use, No Respiratory Distress


Cardiovascular:  Regular Rate, Rhythm, No Edema, No Gallop, No JVD, No Murmur, 

Normal Peripheral Pulses


Gastrointestinal:  Normal Bowel Sounds, Non Tender, Soft, Other (difficult to 

assess organomegaly and for masses due to body habitus)


Rectal:  Deferred


Back:  Normal Inspection


Extremity:  Normal Capillary Refill, Normal Inspection, Normal Range of Motion, 

Non Tender, No Calf Tenderness, No Pedal Edema


Neurologic/Psychiatric:  Alert, Oriented x3, No Motor/Sensory Deficits, Normal 

Mood/Affect


Skin:  Normal Color, Warm/Dry





Results


Results/Procedures


Labs


Laboratory Tests


10/19/22 18:39








10/20/22 05:37








Patient resulted labs reviewed.


Imaging:  Reviewed Imaging Films, Reviewed Imaging Report


Imaging


CT Abd/Pelvis 10/19/2022


IMPRESSION: No acute abnormality in the abdomen or pelvis.





CXR 10/19/2022


IMPRESSION: No acute pulmonary abnormality seen.


Procedures


EKG 10/20/22: sinus rhythm with LAD





Assessment/Plan


Admission Diagnosis


Sepsis secondary to UTI, uncontrolled diabetes, and electrolyte abnormalities


Admission Status:  Inpatient Order (span 2 midnights)


Reason for Inpatient Admission:  


Requires IV antibiotics for UTI, and control over her uncontrolled diabetes





Assessment and Plan


Assessment: Ms. Sakina Duran is a 63 y/o F with MPH of HTN, HLD, DM II, 

Anxiety, Depression, Macular degeneration, and Restless leg syndrome who 

presented to the ED on 10/19/22 due to uncontrolled blood sugars, lower urinary 

tract infection being treated with macrobid, nausea, vomiting, and abdominal 

pain. 





Plan:


1. Sepsis due to UTI


 - WBC of 12.5 on presentation


 - UA: 2+ protein, 1+ glucose, 1+ leuko esterase, mod bacteria, - ketones, 

increased WBC


 - Lactic acid 1.81, Procal 2.79


 - ESR 73, CRP 41.38


 > Cont Cefepime 1g q6 hrs


 > Follow urinary cultures





2. Diabetes mellitus type II


 - Previously on metformin and Victoza, but had discontinued Victoza recently. 


 - Beta-hydroxybutyrate 0.40, ketones negative in urine


 > Started Insulin Detemir 20U BID


 > Started insulin sliding scale


 > follow up A1c





3. Hypokalemia


 - K 2.9


 > Start IV potassium 80 mEq today





4. Hypomagnesemia 


 - Mg 1.7


 > Start IV Magnesium 2g





5. Hyponatremia


 - on presentation 124, corrected was 127.


 - 10/20 Na 130, corrected 132.


 > Cont IVF and encourage oral intake


 > can consider NaCl tablets





6. Nausea


 > Cont Zofran PRN





7. Elevated Lipase


 - Lipase 221, amylase 48


 - CT abd without signs of pancreatitis


 > Continue to monitor





8. Hypochloremia


 - Cl 81 on presentation, 93 on 10/20


 > Continue to monitor





Diet: Diabetic diet


DVT PPx: Lovenox


Code: Full code


Dispo: Continue inpatient hospitalization for IV abx and management of her 

electrolyte abnormalities





Pt discussed and seen with Dr. Diego.





Diana Madrid MS4





SHERLEY DIEGO DO 10/21/22 0524:


History of Present Illness


HPI/Chief Complaint


Chief complaint: UTI with sepsis and hyperglycemia





HPI: This is a 62-year-old female who has been noncompliant with diabetic 

medication for several months presents with elevated sugar and was found to have

a UTI with sepsis.


Source:  patient


Exam Limitations:  no limitations





Assessment/Plan


Admission Diagnosis


IV antibiotics


IV fluid


DC telemetry


Home meds


Insulin


Admission Status:  Observation


Reason for Inpatient Admission:  


Sepsis from UTI





Supervisory-Addendum Brief


Verification & Attestation


Participated in pt care:  history, MDM, physical


Personally performed:  exam, history, MDM, supervision of care


Care discussed with:  Medical Student


Procedures:  n/a


Results interpretation:  Verified all documentation


Verification and Attestation of Medical Student E/M Service





A medical student performed and documented this service in my presence. I 

reviewed and verified all information documented by the medical student and made

modifications to such information, when appropriate. I personally performed the 

physical exam and medical decision making. 





 Sherely Diego, Oct 21, 2022,05:23











DIANA MADRID                  Oct 20, 2022 11:59


SHERLEY DIEGO DO                Oct 21, 2022 05:24

## 2022-10-20 NOTE — OCC THERAPY PROGRESS NOTE
Therapy Progress Note


OT orders received. At OT arrival, nursing present in room and throughout eval. 

Per nursing and pt, she just got back into bed after sitting up in the chair and

using the bathroom independently. Nursing reported that she was SBA-independent 

with transfers from chair and toilet, only needing assist with line management. 

Pt declines OT services, reporting that she is back at baseline for all ADLs, 

nursing agreed. No further skilled OT services warranted at this time.











Porsha Villanueva OT                Oct 20, 2022 09:14

## 2022-10-20 NOTE — PHYSICAL THERAPY PROGRESS NOTE
Therapy Progress Note


Patient is independent with gross motor skills.  Patient is limited due to 

macular degeneration, however, RN confirms patient is independent.  Patient 

declined PT.  No skilled PT indicated.


1 visit











ZARINA MARTINES PT              Oct 20, 2022 10:11

## 2022-10-21 VITALS — DIASTOLIC BLOOD PRESSURE: 68 MMHG | SYSTOLIC BLOOD PRESSURE: 125 MMHG

## 2022-10-21 VITALS — DIASTOLIC BLOOD PRESSURE: 67 MMHG | SYSTOLIC BLOOD PRESSURE: 146 MMHG

## 2022-10-21 VITALS — DIASTOLIC BLOOD PRESSURE: 99 MMHG | SYSTOLIC BLOOD PRESSURE: 160 MMHG

## 2022-10-21 VITALS — DIASTOLIC BLOOD PRESSURE: 83 MMHG | SYSTOLIC BLOOD PRESSURE: 155 MMHG

## 2022-10-21 VITALS — DIASTOLIC BLOOD PRESSURE: 72 MMHG | SYSTOLIC BLOOD PRESSURE: 142 MMHG

## 2022-10-21 VITALS — DIASTOLIC BLOOD PRESSURE: 85 MMHG | SYSTOLIC BLOOD PRESSURE: 135 MMHG

## 2022-10-21 VITALS — SYSTOLIC BLOOD PRESSURE: 130 MMHG | DIASTOLIC BLOOD PRESSURE: 79 MMHG

## 2022-10-21 VITALS — DIASTOLIC BLOOD PRESSURE: 76 MMHG | SYSTOLIC BLOOD PRESSURE: 146 MMHG

## 2022-10-21 LAB
ALBUMIN SERPL-MCNC: 3 GM/DL (ref 3.2–4.5)
ALP SERPL-CCNC: 84 U/L (ref 40–136)
ALT SERPL-CCNC: 36 U/L (ref 0–55)
BASOPHILS # BLD AUTO: 0 10^3/UL (ref 0–0.1)
BASOPHILS NFR BLD AUTO: 0 % (ref 0–10)
BILIRUB SERPL-MCNC: 0.6 MG/DL (ref 0.1–1)
BUN/CREAT SERPL: 13
CALCIUM SERPL-MCNC: 8.4 MG/DL (ref 8.5–10.1)
CHLORIDE SERPL-SCNC: 97 MMOL/L (ref 98–107)
CO2 SERPL-SCNC: 27 MMOL/L (ref 21–32)
CREAT SERPL-MCNC: 0.88 MG/DL (ref 0.6–1.3)
EOSINOPHIL # BLD AUTO: 0 10^3/UL (ref 0–0.3)
EOSINOPHIL NFR BLD AUTO: 0 % (ref 0–10)
GFR SERPLBLD BASED ON 1.73 SQ M-ARVRAT: 75 ML/MIN
GLUCOSE SERPL-MCNC: 152 MG/DL (ref 70–105)
HCT VFR BLD CALC: 33 % (ref 35–52)
HGB BLD-MCNC: 11.3 G/DL (ref 11.5–16)
LYMPHOCYTES # BLD AUTO: 0.8 10^3/UL (ref 1–4)
LYMPHOCYTES NFR BLD AUTO: 12 % (ref 12–44)
MAGNESIUM SERPL-MCNC: 2.2 MG/DL (ref 1.6–2.4)
MANUAL DIFFERENTIAL PERFORMED BLD QL: NO
MCH RBC QN AUTO: 30 PG (ref 25–34)
MCHC RBC AUTO-ENTMCNC: 35 G/DL (ref 32–36)
MCV RBC AUTO: 86 FL (ref 80–99)
MONOCYTES # BLD AUTO: 0.6 10^3/UL (ref 0–1)
MONOCYTES NFR BLD AUTO: 10 % (ref 0–12)
NEUTROPHILS # BLD AUTO: 4.7 10^3/UL (ref 1.8–7.8)
NEUTROPHILS NFR BLD AUTO: 77 % (ref 42–75)
PLATELET # BLD: 154 10^3/UL (ref 130–400)
PMV BLD AUTO: 10.1 FL (ref 9–12.2)
POTASSIUM SERPL-SCNC: 3.1 MMOL/L (ref 3.6–5)
PROT SERPL-MCNC: 6.3 GM/DL (ref 6.4–8.2)
SODIUM SERPL-SCNC: 133 MMOL/L (ref 135–145)
WBC # BLD AUTO: 6.2 10^3/UL (ref 4.3–11)

## 2022-10-21 RX ADMIN — ENOXAPARIN SODIUM SCH MG: 100 INJECTION SUBCUTANEOUS at 20:39

## 2022-10-21 RX ADMIN — POTASSIUM CHLORIDE SCH MEQ: 1500 TABLET, EXTENDED RELEASE ORAL at 18:27

## 2022-10-21 RX ADMIN — ONDANSETRON PRN MG: 2 INJECTION, SOLUTION INTRAMUSCULAR; INTRAVENOUS at 20:39

## 2022-10-21 RX ADMIN — ALPRAZOLAM PRN MG: 0.5 TABLET ORAL at 13:08

## 2022-10-21 RX ADMIN — INSULIN ASPART SCH UNIT: 100 INJECTION, SOLUTION INTRAVENOUS; SUBCUTANEOUS at 06:00

## 2022-10-21 RX ADMIN — POTASSIUM CHLORIDE SCH MEQ: 1500 TABLET, EXTENDED RELEASE ORAL at 13:03

## 2022-10-21 RX ADMIN — SODIUM CHLORIDE SCH MLS/HR: 900 INJECTION INTRAVENOUS at 13:03

## 2022-10-21 RX ADMIN — INSULIN ASPART SCH UNIT: 100 INJECTION, SOLUTION INTRAVENOUS; SUBCUTANEOUS at 20:41

## 2022-10-21 RX ADMIN — ENOXAPARIN SODIUM SCH MG: 100 INJECTION SUBCUTANEOUS at 08:22

## 2022-10-21 RX ADMIN — ALPRAZOLAM PRN MG: 0.5 TABLET ORAL at 22:59

## 2022-10-21 RX ADMIN — SODIUM CHLORIDE AND POTASSIUM CHLORIDE SCH MLS/HR: 9; 1.49 INJECTION, SOLUTION INTRAVENOUS at 03:24

## 2022-10-21 RX ADMIN — SENNOSIDES SCH MG: 8.6 TABLET, FILM COATED ORAL at 08:22

## 2022-10-21 RX ADMIN — DOCUSATE SODIUM SCH MG: 100 CAPSULE ORAL at 08:22

## 2022-10-21 RX ADMIN — INSULIN ASPART SCH UNIT: 100 INJECTION, SOLUTION INTRAVENOUS; SUBCUTANEOUS at 17:01

## 2022-10-21 RX ADMIN — INSULIN ASPART SCH UNIT: 100 INJECTION, SOLUTION INTRAVENOUS; SUBCUTANEOUS at 06:16

## 2022-10-21 RX ADMIN — INSULIN ASPART SCH UNIT: 100 INJECTION, SOLUTION INTRAVENOUS; SUBCUTANEOUS at 16:46

## 2022-10-21 RX ADMIN — KETOROLAC TROMETHAMINE PRN MG: 15 INJECTION, SOLUTION INTRAMUSCULAR; INTRAVENOUS at 20:39

## 2022-10-21 RX ADMIN — ALPRAZOLAM PRN MG: 0.5 TABLET ORAL at 01:36

## 2022-10-21 RX ADMIN — INSULIN ASPART SCH UNIT: 100 INJECTION, SOLUTION INTRAVENOUS; SUBCUTANEOUS at 11:31

## 2022-10-21 RX ADMIN — SENNOSIDES SCH MG: 8.6 TABLET, FILM COATED ORAL at 20:39

## 2022-10-21 RX ADMIN — POTASSIUM CHLORIDE SCH MEQ: 1500 TABLET, EXTENDED RELEASE ORAL at 08:21

## 2022-10-21 RX ADMIN — SODIUM CHLORIDE SCH MLS/HR: 900 INJECTION INTRAVENOUS at 03:19

## 2022-10-21 RX ADMIN — DOCUSATE SODIUM SCH MG: 100 CAPSULE ORAL at 20:39

## 2022-10-21 RX ADMIN — SODIUM CHLORIDE SCH MLS/HR: 900 INJECTION INTRAVENOUS at 10:21

## 2022-10-21 NOTE — PROGRESS NOTE - HOSPITALIST
MERCYDIANA 10/21/22 1246:


Subjective


HPI/CC On Admission


Date Seen by Provider:  Oct 21, 2022


Time Seen by Provider:  12:00


Chief complaint: UTI with sepsis and hyperglycemia





HPI: This is a 62-year-old female who has been noncompliant with diabetic 

medication for several months presents with elevated sugar and was found to have

a UTI with sepsis.


Subjective/Events-last exam


Reports severe headache. Persistent since Sunday. Describes as sharp pain from 

back of neck radiating to her eyes


Otherwise is feeling beter


Felt feverish overnight


Wants a new PCP


Also wants a walking cane and handicap sticker for her macular degeneration





Review of Systems


HEENT:  Head Aches (sharp pain radiating from neck to back of eyes), Eye Pain





Focused Exam


Lactate Level


10/19/22 18:39: Lactic Acid Level 1.81





Time of Focused Exam:  19:30





Objective


Exam


Vital Signs





Vital Signs








  Date Time  Temp Pulse Resp B/P (MAP) Pulse Ox O2 Delivery O2 Flow Rate FiO2


 


10/21/22 12:12 37.5 81 19 130/79 (96) 95 Room Air  


 


10/21/22 08:37       0.00 


 


10/20/22 00:15        21





Capillary Refill : Less Than 3 Seconds


General Appearance:  No Apparent Distress, WD/WN, Obese


HEENT:  PERRL/EOMI, Normal ENT Inspection, Photophobia (reports some pain 

associated with vision that she is able to see with her macular degeneration)


Neck:  Full Range of Motion, Normal Inspection, Non Tender, Supple


Respiratory:  Chest Non Tender, Lungs Clear, Normal Breath Sounds, No Accessory 

Muscle Use, No Respiratory Distress


Cardiovascular:  Regular Rate, Rhythm, No Edema, No Gallop, No JVD, No Murmur, 

Normal Peripheral Pulses


Gastrointestinal:  Normal Bowel Sounds, No Organomegaly, No Pulsatile Mass, Non 

Tender, Soft


Rectal:  Deferred


Back:  Normal Inspection, No CVA Tenderness


Extremity:  Normal Capillary Refill, Normal Inspection, Normal Range of Motion, 

Non Tender, No Calf Tenderness, No Pedal Edema


Neurologic/Psychiatric:  Alert, Oriented x3, No Motor/Sensory Deficits, Normal 

Mood/Affect


Skin:  Normal Color, Warm/Dry





Results/Procedures


Lab


Laboratory Tests


10/21/22 05:52








Patient resulted labs reviewed.


Imaging:  Reviewed Imaging Films, Reviewed Imaging Report


Procedures


EKG 10/20/22: sinus rhythm with LAD





Assessment/Plan


Assessment and Plan


Assess & Plan/Chief Complaint


Assessment: Ms. Sakina Duran is a 63 y/o F with MPH of HTN, HLD, DM II, 

Anxiety, Depression, Macular degeneration, and Restless leg syndrome who 

presented to the ED on 10/19/22 due to uncontrolled blood sugars, lower urinary 

tract infection being treated with macrobid, nausea, vomiting, and abdominal 

pain. 





Plan:


1. Sepsis due to E coli UTI and bacteremia


 - WBC of 12.5 on presentation, now 6.2


 - UA: 2+ protein, 1+ glucose, 1+ leuko esterase, mod bacteria, - ketones, 

increased WBC.


 - Urine culture positive for E coli, sensitivities pending


 - Blood cx positive for E coli, sensitivities pending


 > Cont Cefepime 1g q6 hrs





2. Diabetes mellitus type II


 - Previously on metformin and Victoza, but had discontinued Victoza recently. 


 - Beta-hydroxybutyrate 0.40, ketones negative in urine


 - A1c: 7.7%


 > Started Insulin Detemir 20U BID


 > Started insulin sliding scale


 > pt would like consider moving from oral hyperglycemic medications to insulin 

and sliding scale at home





3. Hypokalemia


 - K 2.9 on presentation, 3.1 on 10/21


 - s/p IV potassium 80 mEq on 10/19


 > Potassium Chloride 20 mEq today





4. Hypomagnesemia, resolved


 - Mg 1.7, 2.2 on 10/21


 > Continue to monitor





5. Hyponatremia, improving


 - on presentation 124, corrected was 127.


 - 10/20 Na 130, corrected 132.


 - 10/21 Na 133


 > Cont IVF and encourage oral intake





6. Nausea


 > Cont Zofran PRN





7. Elevated Lipase


 - Lipase 221, amylase 48


 - CT abd without signs of pancreatitis


 > Continue to monitor





8. Hypochloremia, improving


 - Cl 81 on presentation, 97 on 10/21


 > Continue to monitor





9. Headache


 - pt describes sharp pain radiating from bilateral posterior head/neck to the 

back of her eyes. 


 - states does improve some with pain control


 - differential includes: cluster vs migraine


 > Continue pain control, can consider future O2 trial and/or triptan trial if 

no improvement with improvement of her bacteremia. 


 


Diet: Diabetic diet


DVT PPx: Lovenox


Code: Full code


Dispo: Continue inpatient hospitalization for IV abx and management of her 

electrolyte abnormalities, can consider DC on 10/22/22





SHERLEY DIEGO DO 10/22/22 0503:


Subjective


Subjective/Events-last exam


Patient having pretty well


We will Hep-Lock IV fluid


Patient ambulating


Review of Systems


General:  Fatigue, Malaise





Objective


Exam


General Appearance:  No Apparent Distress, WD/WN, Chronically ill, Obese


Respiratory:  Lungs Clear


Cardiovascular:  Regular Rate, Rhythm


Neurologic/Psychiatric:  Alert, Oriented x3





Assessment/Plan


Assessment and Plan


Assess & Plan/Chief Complaint


Continue to treat bacteremia





Supervisory-Addendum Brief


Verification & Attestation


Participated in pt care:  history, MDM, physical


Personally performed:  exam, history, MDM, supervision of care


Care discussed with:  Medical Student


Procedures:  n/a


Results interpretation:  Verified all documentation


Verification and Attestation of Medical Student E/M Service





A medical student performed and documented this service in my presence. I 

reviewed and verified all information documented by the medical student and made

modifications to such information, when appropriate. I personally performed the 

physical exam and medical decision making. 





 Sherley Diego Oct 22, 2022,05:02











DIANA MADRID                  Oct 21, 2022 12:46


SHERLEY DIEGO DO                Oct 22, 2022 05:03

## 2022-10-22 VITALS — DIASTOLIC BLOOD PRESSURE: 60 MMHG | SYSTOLIC BLOOD PRESSURE: 131 MMHG

## 2022-10-22 VITALS — DIASTOLIC BLOOD PRESSURE: 58 MMHG | SYSTOLIC BLOOD PRESSURE: 119 MMHG

## 2022-10-22 VITALS — SYSTOLIC BLOOD PRESSURE: 134 MMHG | DIASTOLIC BLOOD PRESSURE: 79 MMHG

## 2022-10-22 VITALS — SYSTOLIC BLOOD PRESSURE: 154 MMHG | DIASTOLIC BLOOD PRESSURE: 82 MMHG

## 2022-10-22 LAB
ALBUMIN SERPL-MCNC: 3.1 GM/DL (ref 3.2–4.5)
ALP SERPL-CCNC: 106 U/L (ref 40–136)
ALT SERPL-CCNC: 51 U/L (ref 0–55)
BASOPHILS # BLD AUTO: 0 10^3/UL (ref 0–0.1)
BASOPHILS NFR BLD AUTO: 0 % (ref 0–10)
BILIRUB SERPL-MCNC: 0.6 MG/DL (ref 0.1–1)
BUN/CREAT SERPL: 9
CALCIUM SERPL-MCNC: 8.8 MG/DL (ref 8.5–10.1)
CHLORIDE SERPL-SCNC: 99 MMOL/L (ref 98–107)
CO2 SERPL-SCNC: 26 MMOL/L (ref 21–32)
CREAT SERPL-MCNC: 0.91 MG/DL (ref 0.6–1.3)
EOSINOPHIL # BLD AUTO: 0 10^3/UL (ref 0–0.3)
EOSINOPHIL NFR BLD AUTO: 0 % (ref 0–10)
GFR SERPLBLD BASED ON 1.73 SQ M-ARVRAT: 72 ML/MIN
GLUCOSE SERPL-MCNC: 150 MG/DL (ref 70–105)
HCT VFR BLD CALC: 36 % (ref 35–52)
HGB BLD-MCNC: 12.3 G/DL (ref 11.5–16)
LYMPHOCYTES # BLD AUTO: 0.9 10^3/UL (ref 1–4)
LYMPHOCYTES NFR BLD AUTO: 15 % (ref 12–44)
MAGNESIUM SERPL-MCNC: 1.9 MG/DL (ref 1.6–2.4)
MANUAL DIFFERENTIAL PERFORMED BLD QL: NO
MCH RBC QN AUTO: 30 PG (ref 25–34)
MCHC RBC AUTO-ENTMCNC: 34 G/DL (ref 32–36)
MCV RBC AUTO: 88 FL (ref 80–99)
MONOCYTES # BLD AUTO: 0.7 10^3/UL (ref 0–1)
MONOCYTES NFR BLD AUTO: 12 % (ref 0–12)
NEUTROPHILS # BLD AUTO: 4.2 10^3/UL (ref 1.8–7.8)
NEUTROPHILS NFR BLD AUTO: 71 % (ref 42–75)
PLATELET # BLD: 182 10^3/UL (ref 130–400)
PMV BLD AUTO: 9.5 FL (ref 9–12.2)
POTASSIUM SERPL-SCNC: 3.3 MMOL/L (ref 3.6–5)
PROT SERPL-MCNC: 6.8 GM/DL (ref 6.4–8.2)
SODIUM SERPL-SCNC: 136 MMOL/L (ref 135–145)
WBC # BLD AUTO: 5.9 10^3/UL (ref 4.3–11)

## 2022-10-22 RX ADMIN — POTASSIUM CHLORIDE SCH MEQ: 1500 TABLET, EXTENDED RELEASE ORAL at 11:39

## 2022-10-22 RX ADMIN — INSULIN ASPART SCH UNIT: 100 INJECTION, SOLUTION INTRAVENOUS; SUBCUTANEOUS at 06:11

## 2022-10-22 RX ADMIN — ENOXAPARIN SODIUM SCH MG: 100 INJECTION SUBCUTANEOUS at 08:43

## 2022-10-22 RX ADMIN — INSULIN ASPART SCH UNIT: 100 INJECTION, SOLUTION INTRAVENOUS; SUBCUTANEOUS at 05:57

## 2022-10-22 RX ADMIN — SODIUM CHLORIDE SCH MLS/HR: 900 INJECTION INTRAVENOUS at 11:38

## 2022-10-22 RX ADMIN — POTASSIUM CHLORIDE SCH MEQ: 1500 TABLET, EXTENDED RELEASE ORAL at 08:43

## 2022-10-22 RX ADMIN — ALPRAZOLAM PRN MG: 0.5 TABLET ORAL at 11:51

## 2022-10-22 RX ADMIN — SENNOSIDES SCH MG: 8.6 TABLET, FILM COATED ORAL at 09:00

## 2022-10-22 RX ADMIN — INSULIN ASPART SCH UNIT: 100 INJECTION, SOLUTION INTRAVENOUS; SUBCUTANEOUS at 11:38

## 2022-10-22 RX ADMIN — DOCUSATE SODIUM SCH MG: 100 CAPSULE ORAL at 09:00

## 2022-10-22 NOTE — DISCHARGE SUMMARY
Discharge Summary


Hospital Course


Was the Problem List Reviewed?:  Yes


Problems/Dx:  


(1) UTI (urinary tract infection)


Status:  Acute


(2) Sepsis


Status:  Acute


Hospital Course


Date of Admission: Oct 21, 2022 at 12:00 


Admission Diagnosis :  





Family Physician/Provider: Marcos Aguilar DO  





Date of Discharge: 10/22/22 


Discharge Diagnosis: [ ]








Hospital Course:


Patient had an uneventful hospital course after she was admitted for h

yponatremia dehydration acute kidney injury with sepsis.  UTI was treated with 

Rocephin which was sensitive on culture report.  Bacteremia noted on blood 

cultures she was deemed stable for discharge labs are back to normal and insulin

was sent in along with antibiotics.














Labs and Pending Lab Test:


Laboratory Tests


10/21/22 16:20: Glucometer 173H


10/21/22 20:40: Glucometer 152H


10/22/22 00:43: Glucometer 119H


10/22/22 05:36: 


White Blood Count 5.9, Red Blood Count 4.11, Hemoglobin 12.3, Hematocrit 36, 

Mean Corpuscular Volume 88, Mean Corpuscular Hemoglobin 30, Mean Corpuscular 

Hemoglobin Concent 34, Red Cell Distribution Width 12.2, Platelet Count 182, 

Mean Platelet Volume 9.5, Immature Granulocyte % (Auto) 1, Neutrophils (%) 

(Auto) 71, Lymphocytes (%) (Auto) 15, Monocytes (%) (Auto) 12, Eosinophils (%) 

(Auto) 0, Basophils (%) (Auto) 0, Neutrophils # (Auto) 4.2, Lymphocytes # (Auto)

0.9L, Monocytes # (Auto) 0.7, Eosinophils # (Auto) 0.0, Basophils # (Auto) 0.0, 

Immature Granulocyte # (Auto) 0.1, Sodium Level 136, Potassium Level 3.3L, 

Chloride Level 99, Carbon Dioxide Level 26, Anion Gap 11, Blood Urea Nitrogen 8,

Creatinine 0.91, Estimat Glomerular Filtration Rate 72, BUN/Creatinine Ratio 9, 

Glucose Level 150H, Calcium Level 8.8, Corrected Calcium 9.5, Magnesium Level 

1.9, Total Bilirubin 0.6, Aspartate Amino Transf (AST/SGOT) 77H, Alanine 

Aminotransferase (ALT/SGPT) 51, Alkaline Phosphatase 106, Total Protein 6.8, 

Albumin 3.1L


10/22/22 05:45: Glucometer 138H


10/22/22 11:09: Glucometer 239H





Microbiology


10/19/22 Blood Culture - Preliminary, Resulted


           Escherichia coli


           Testing In Progress


10/19/22 Urine Culture - Final, Complete


           Mixed Bacterial Leila


           Escherichia coli





Home Meds


Active


Cefdinir 300 Mg Capsule 300 Mg PO BID


Advocate Pen Needle (Pen Needle, Diabetic) 33 Gauge X 5/32" Dis.needle Each MC 

ACHS


Novolog Flexpen (Insulin Aspart) 100 Unit/Ml (3 Ml) Solution 5 Units SQ AC


Levemir Flextouch (Insulin Detemir) 100 Unit/Ml (3 Ml) Insuln.pen 15 Unit SQ BID


Klor-Con M20 (Potassium Chloride) 20 Meq Tab.er.prt 20 Meq PO DAILY


Reported


Multivitamin 1 Each Tablet 1 Each PO DAILY


Macrobid 100 mg Capsule (Nitrofurantoin Monohyd/M-Cryst) 100 Mg Capsule 1 Tab PO

BID


     FILLED 10- #10/5 DAY SUPPLY


Cyclobenzaprine HCl 10 Mg Tablet 10 Mg PO TID PRN


Gabapentin 100 Mg Capsule 300 Mg PO HS


     TAKES 3 (100MG) CAPS


Hydrochlorothiazide 25 Mg Tablet 25 Mg PO DAILY


Fluoxetine HCl 40 Mg Capsule 40 Mg PO DAILY


Metoprolol Tartrate 50 Mg Tablet 50 Mg PO BID WITH MEALS


Mirapex (Pramipexole Di-HCl) 1.5 Mg Tablet 1.5 Mg PO HS


Victoza 3-Kvng (Liraglutide) 0.6 Mg/0.1 Ml (18 Mg/3 Ml) Pen.injctr 3 Mg SQ DAILY


Metformin HCl 500 Mg Tablet 1,000 Mg PO BID


     TAKES 2 (500MG) TABS


Assessment/Pt Instructions


PCP in 1 week


Discharge Planning:  <30 minutes discharge planning





Discharge Physical Examination


Vital Signs





Vital Signs








  Date Time  Temp Pulse Resp B/P (MAP) Pulse Ox O2 Delivery O2 Flow Rate FiO2


 


10/22/22 08:34 37.5 96 17 154/82 (106) 92 Room Air  


 


10/21/22 08:37       0.00 


 


10/20/22 00:15        21








General Appearance:  No Apparent Distress, WD/WN, Chronically ill


Allergies:  


Coded Allergies:  


     penicillin V (Verified  Allergy, Severe, HIVES, 10/19/22)


     pregabalin (Verified  Allergy, Severe, EFFECTS EYE SIGHT, 10/19/22)


     codeine (Verified  Adverse Reaction, Unknown, VOMITING, 10/19/22)





Discharge Summary


Date of Admission


Oct 21, 2022 at 12:00


Date of Discharge





Discharge Date:  Oct 22, 2022


Admission Diagnosis


IV antibiotics


IV fluid


DC telemetry


Home meds


Insulin


Discharge Diagnosis


Continue to treat bacteremia











MIRIAM DIEGO DO                Oct 22, 2022 12:17

## 2022-10-22 NOTE — PROGRESS NOTE - HOSPITALIST
Subjective


HPI/CC On Admission


Date Seen by Provider:  Oct 22, 2022


Time Seen by Provider:  11:30


Chief complaint: UTI with sepsis and hyperglycemia





HPI: This is a 62-year-old female who has been noncompliant with diabetic 

medication for several months presents with elevated sugar and was found to have

a UTI with sepsis.





Focused Exam


Lactate Level


10/19/22 18:39: Lactic Acid Level 1.81





Time of Focused Exam:  19:30





Objective


Exam


Vital Signs





Vital Signs








  Date Time  Temp Pulse Resp B/P (MAP) Pulse Ox O2 Delivery O2 Flow Rate FiO2


 


10/22/22 08:34 37.5 96 17 154/82 (106) 92 Room Air  


 


10/21/22 08:37       0.00 


 


10/20/22 00:15        21





Capillary Refill : Less Than 3 Seconds





Results/Procedures


Lab


Laboratory Tests


10/22/22 05:36








Patient resulted labs reviewed.


Imaging:  Reviewed Imaging Films, Reviewed Imaging Report


Procedures


EKG 10/20/22: sinus rhythm with LAD





Assessment/Plan


Assessment and Plan


Assess & Plan/Chief Complaint


Continue to treat bacteremia











MIRIAM DIEGO DO                Oct 22, 2022 05:44